# Patient Record
Sex: FEMALE | Race: OTHER | HISPANIC OR LATINO | Employment: FULL TIME | URBAN - METROPOLITAN AREA
[De-identification: names, ages, dates, MRNs, and addresses within clinical notes are randomized per-mention and may not be internally consistent; named-entity substitution may affect disease eponyms.]

---

## 2018-01-25 ENCOUNTER — HOSPITAL ENCOUNTER (INPATIENT)
Facility: HOSPITAL | Age: 43
LOS: 2 days | Discharge: HOME | DRG: 758 | End: 2018-01-27
Attending: EMERGENCY MEDICINE | Admitting: OBSTETRICS & GYNECOLOGY

## 2018-01-25 ENCOUNTER — APPOINTMENT (EMERGENCY)
Dept: RADIOLOGY | Facility: HOSPITAL | Age: 43
DRG: 758 | End: 2018-01-25

## 2018-01-25 ENCOUNTER — APPOINTMENT (EMERGENCY)
Dept: CT IMAGING | Facility: HOSPITAL | Age: 43
DRG: 758 | End: 2018-01-25

## 2018-01-25 DIAGNOSIS — M25.571 RIGHT ANKLE PAIN: ICD-10-CM

## 2018-01-25 DIAGNOSIS — N70.93 RIGHT PYOSALPINX: Primary | ICD-10-CM

## 2018-01-25 LAB
ALBUMIN SERPL BCP-MCNC: 3 G/DL (ref 3.5–5)
ALP SERPL-CCNC: 85 U/L (ref 46–116)
ALT SERPL W P-5'-P-CCNC: 18 U/L (ref 12–78)
ANION GAP SERPL CALCULATED.3IONS-SCNC: 8 MMOL/L (ref 4–13)
AST SERPL W P-5'-P-CCNC: 12 U/L (ref 5–45)
BACTERIA UR QL AUTO: ABNORMAL /HPF
BASOPHILS # BLD AUTO: 0.02 THOUSANDS/ΜL (ref 0–0.1)
BASOPHILS NFR BLD AUTO: 0 % (ref 0–1)
BILIRUB SERPL-MCNC: 0.29 MG/DL (ref 0.2–1)
BILIRUB UR QL STRIP: NEGATIVE
BUN SERPL-MCNC: 9 MG/DL (ref 5–25)
CALCIUM SERPL-MCNC: 8.7 MG/DL (ref 8.3–10.1)
CHLORIDE SERPL-SCNC: 98 MMOL/L (ref 100–108)
CLARITY UR: ABNORMAL
CO2 SERPL-SCNC: 29 MMOL/L (ref 21–32)
COLOR UR: YELLOW
CREAT SERPL-MCNC: 0.93 MG/DL (ref 0.6–1.3)
EOSINOPHIL # BLD AUTO: 0.1 THOUSAND/ΜL (ref 0–0.61)
EOSINOPHIL NFR BLD AUTO: 1 % (ref 0–6)
ERYTHROCYTE [DISTWIDTH] IN BLOOD BY AUTOMATED COUNT: 14.3 % (ref 11.6–15.1)
EST. AVERAGE GLUCOSE BLD GHB EST-MCNC: 111 MG/DL
EXT PREG TEST URINE: NORMAL
GFR SERPL CREATININE-BSD FRML MDRD: 75 ML/MIN/1.73SQ M
GLUCOSE SERPL-MCNC: 101 MG/DL (ref 65–140)
GLUCOSE UR STRIP-MCNC: NEGATIVE MG/DL
HBA1C MFR BLD: 5.5 % (ref 4.2–6.3)
HCT VFR BLD AUTO: 38 % (ref 34.8–46.1)
HGB BLD-MCNC: 12.6 G/DL (ref 11.5–15.4)
HGB UR QL STRIP.AUTO: ABNORMAL
HIV 1+2 AB+HIV1 P24 AG SERPL QL IA: NORMAL
HIV1 P24 AG SER QL: NORMAL
KETONES UR STRIP-MCNC: NEGATIVE MG/DL
LEUKOCYTE ESTERASE UR QL STRIP: ABNORMAL
LIPASE SERPL-CCNC: 90 U/L (ref 73–393)
LYMPHOCYTES # BLD AUTO: 2.15 THOUSANDS/ΜL (ref 0.6–4.47)
LYMPHOCYTES NFR BLD AUTO: 13 % (ref 14–44)
MCH RBC QN AUTO: 27.5 PG (ref 26.8–34.3)
MCHC RBC AUTO-ENTMCNC: 33.2 G/DL (ref 31.4–37.4)
MCV RBC AUTO: 83 FL (ref 82–98)
MONOCYTES # BLD AUTO: 1.62 THOUSAND/ΜL (ref 0.17–1.22)
MONOCYTES NFR BLD AUTO: 10 % (ref 4–12)
NEUTROPHILS # BLD AUTO: 12.32 THOUSANDS/ΜL (ref 1.85–7.62)
NEUTS SEG NFR BLD AUTO: 76 % (ref 43–75)
NITRITE UR QL STRIP: POSITIVE
NON-SQ EPI CELLS URNS QL MICRO: ABNORMAL /HPF
NRBC BLD AUTO-RTO: 0 /100 WBCS
PH UR STRIP.AUTO: 6 [PH] (ref 4.5–8)
PLATELET # BLD AUTO: 369 THOUSANDS/UL (ref 149–390)
PMV BLD AUTO: 9.6 FL (ref 8.9–12.7)
POTASSIUM SERPL-SCNC: 3.7 MMOL/L (ref 3.5–5.3)
PROT SERPL-MCNC: 7.8 G/DL (ref 6.4–8.2)
PROT UR STRIP-MCNC: ABNORMAL MG/DL
RBC # BLD AUTO: 4.59 MILLION/UL (ref 3.81–5.12)
RBC #/AREA URNS AUTO: ABNORMAL /HPF
SODIUM SERPL-SCNC: 135 MMOL/L (ref 136–145)
SP GR UR STRIP.AUTO: 1.01 (ref 1–1.03)
UROBILINOGEN UR QL STRIP.AUTO: 0.2 E.U./DL
WBC # BLD AUTO: 16.21 THOUSAND/UL (ref 4.31–10.16)
WBC #/AREA URNS AUTO: ABNORMAL /HPF

## 2018-01-25 PROCEDURE — 87086 URINE CULTURE/COLONY COUNT: CPT | Performed by: OBSTETRICS & GYNECOLOGY

## 2018-01-25 PROCEDURE — 74177 CT ABD & PELVIS W/CONTRAST: CPT

## 2018-01-25 PROCEDURE — 87806 HIV AG W/HIV1&2 ANTB W/OPTIC: CPT | Performed by: OBSTETRICS & GYNECOLOGY

## 2018-01-25 PROCEDURE — 83036 HEMOGLOBIN GLYCOSYLATED A1C: CPT | Performed by: OBSTETRICS & GYNECOLOGY

## 2018-01-25 PROCEDURE — 81001 URINALYSIS AUTO W/SCOPE: CPT

## 2018-01-25 PROCEDURE — 99221 1ST HOSP IP/OBS SF/LOW 40: CPT | Performed by: OBSTETRICS & GYNECOLOGY

## 2018-01-25 PROCEDURE — 81025 URINE PREGNANCY TEST: CPT | Performed by: EMERGENCY MEDICINE

## 2018-01-25 PROCEDURE — 73610 X-RAY EXAM OF ANKLE: CPT

## 2018-01-25 PROCEDURE — 80053 COMPREHEN METABOLIC PANEL: CPT | Performed by: EMERGENCY MEDICINE

## 2018-01-25 PROCEDURE — 36415 COLL VENOUS BLD VENIPUNCTURE: CPT | Performed by: EMERGENCY MEDICINE

## 2018-01-25 PROCEDURE — 99285 EMERGENCY DEPT VISIT HI MDM: CPT

## 2018-01-25 PROCEDURE — 96361 HYDRATE IV INFUSION ADD-ON: CPT

## 2018-01-25 PROCEDURE — 96374 THER/PROPH/DIAG INJ IV PUSH: CPT

## 2018-01-25 PROCEDURE — 87491 CHLMYD TRACH DNA AMP PROBE: CPT | Performed by: OBSTETRICS & GYNECOLOGY

## 2018-01-25 PROCEDURE — 81002 URINALYSIS NONAUTO W/O SCOPE: CPT | Performed by: EMERGENCY MEDICINE

## 2018-01-25 PROCEDURE — 87591 N.GONORRHOEAE DNA AMP PROB: CPT | Performed by: OBSTETRICS & GYNECOLOGY

## 2018-01-25 PROCEDURE — 83690 ASSAY OF LIPASE: CPT | Performed by: EMERGENCY MEDICINE

## 2018-01-25 PROCEDURE — 85025 COMPLETE CBC W/AUTO DIFF WBC: CPT | Performed by: EMERGENCY MEDICINE

## 2018-01-25 RX ORDER — KETOROLAC TROMETHAMINE 30 MG/ML
15 INJECTION, SOLUTION INTRAMUSCULAR; INTRAVENOUS ONCE
Status: COMPLETED | OUTPATIENT
Start: 2018-01-25 | End: 2018-01-25

## 2018-01-25 RX ORDER — SODIUM CHLORIDE, SODIUM LACTATE, POTASSIUM CHLORIDE, CALCIUM CHLORIDE 600; 310; 30; 20 MG/100ML; MG/100ML; MG/100ML; MG/100ML
125 INJECTION, SOLUTION INTRAVENOUS CONTINUOUS
Status: DISCONTINUED | OUTPATIENT
Start: 2018-01-25 | End: 2018-01-27 | Stop reason: HOSPADM

## 2018-01-25 RX ORDER — NICOTINE 21 MG/24HR
1 PATCH, TRANSDERMAL 24 HOURS TRANSDERMAL DAILY
Status: DISCONTINUED | OUTPATIENT
Start: 2018-01-26 | End: 2018-01-27 | Stop reason: HOSPADM

## 2018-01-25 RX ORDER — OXYCODONE HYDROCHLORIDE AND ACETAMINOPHEN 5; 325 MG/1; MG/1
1 TABLET ORAL EVERY 4 HOURS PRN
Status: DISCONTINUED | OUTPATIENT
Start: 2018-01-25 | End: 2018-01-27 | Stop reason: HOSPADM

## 2018-01-25 RX ORDER — DOCUSATE SODIUM 100 MG/1
100 CAPSULE, LIQUID FILLED ORAL 2 TIMES DAILY
Status: DISCONTINUED | OUTPATIENT
Start: 2018-01-25 | End: 2018-01-27 | Stop reason: HOSPADM

## 2018-01-25 RX ORDER — IBUPROFEN 600 MG/1
600 TABLET ORAL EVERY 6 HOURS PRN
Status: DISCONTINUED | OUTPATIENT
Start: 2018-01-25 | End: 2018-01-27 | Stop reason: HOSPADM

## 2018-01-25 RX ADMIN — SODIUM CHLORIDE, SODIUM LACTATE, POTASSIUM CHLORIDE, AND CALCIUM CHLORIDE 125 ML/HR: .6; .31; .03; .02 INJECTION, SOLUTION INTRAVENOUS at 18:51

## 2018-01-25 RX ADMIN — DOXYCYCLINE 100 MG: 100 INJECTION, POWDER, LYOPHILIZED, FOR SOLUTION INTRAVENOUS at 21:17

## 2018-01-25 RX ADMIN — KETOROLAC TROMETHAMINE 15 MG: 30 INJECTION, SOLUTION INTRAMUSCULAR at 10:46

## 2018-01-25 RX ADMIN — CEFOXITIN 2000 MG: 1 INJECTION, POWDER, FOR SOLUTION INTRAVENOUS at 20:20

## 2018-01-25 RX ADMIN — DOCUSATE SODIUM 100 MG: 100 CAPSULE, LIQUID FILLED ORAL at 21:16

## 2018-01-25 RX ADMIN — IOHEXOL 100 ML: 350 INJECTION, SOLUTION INTRAVENOUS at 13:46

## 2018-01-25 RX ADMIN — SODIUM CHLORIDE 1000 ML: 0.9 INJECTION, SOLUTION INTRAVENOUS at 10:45

## 2018-01-25 RX ADMIN — OXYCODONE HYDROCHLORIDE AND ACETAMINOPHEN 1 TABLET: 5; 325 TABLET ORAL at 21:16

## 2018-01-25 RX ADMIN — SODIUM CHLORIDE 1000 ML: 0.9 INJECTION, SOLUTION INTRAVENOUS at 12:18

## 2018-01-25 NOTE — ED NOTES
Pt sleeping soundly, awakens easily and states pain is "3" currently        Alvaro Clayton, TANMAY  01/25/18 7848

## 2018-01-25 NOTE — ED PROVIDER NOTES
History  Chief Complaint   Patient presents with    Pelvic Pain     Pt reports lower pelvic pain and bilateral leg soreness that started on monday  Pt denies vaginal bleeding, discharge, nausea, vomiting or urinary s/s  Pt reports legs feel sore, denies injury but states stands on her feet all day    Leg Pain     Having RLQ pain x 3 days and right ankle pain/swelling  Denies injury  Denies redness to ankle  History provided by:  Patient   used: No    Abdominal Pain   Pain location:  RLQ  Pain quality: cramping    Pain radiates to:  Does not radiate  Duration:  3 days  Timing:  Constant  Progression:  Unchanged  Chronicity:  New  Context: previous surgery ()    Relieved by:  Nothing  Worsened by:  Nothing  Ineffective treatments:  None tried  Associated symptoms: no chills, no constipation, no cough, no diarrhea, no dysuria, no fever, no hematuria, no nausea, no vaginal bleeding, no vaginal discharge and no vomiting    Ankle Pain   Location:  Ankle  Injury: no    Ankle location:  R ankle  Pain details:     Quality:  Aching    Radiates to:  Does not radiate    Timing:  Constant    Progression:  Unchanged  Chronicity:  New  Prior injury to area:  No  Relieved by:  Nothing  Worsened by:  Bearing weight  Ineffective treatments:  None tried  Associated symptoms: swelling    Associated symptoms: no back pain, no decreased ROM, no fever, no muscle weakness, no numbness, no stiffness and no tingling        None       History reviewed  No pertinent past medical history  Past Surgical History:   Procedure Laterality Date     SECTION         History reviewed  No pertinent family history  I have reviewed and agree with the history as documented      Social History   Substance Use Topics    Smoking status: Current Every Day Smoker    Smokeless tobacco: Never Used    Alcohol use Yes      Comment: social         Review of Systems   Constitutional: Negative for appetite change, chills and fever  HENT: Negative for congestion and rhinorrhea  Respiratory: Negative for cough  Gastrointestinal: Positive for abdominal pain  Negative for abdominal distention, blood in stool, constipation, diarrhea, nausea and vomiting  Genitourinary: Negative for dysuria, flank pain, frequency, hematuria, urgency, vaginal bleeding and vaginal discharge  Musculoskeletal: Negative for back pain and stiffness  Right ankle pain   All other systems reviewed and are negative  Physical Exam  ED Triage Vitals [01/25/18 1005]   Temperature Pulse Respirations Blood Pressure SpO2   97 9 °F (36 6 °C) (!) 110 16 135/96 100 %      Temp Source Heart Rate Source Patient Position - Orthostatic VS BP Location FiO2 (%)   Oral Monitor Sitting Right arm --      Pain Score       4           Orthostatic Vital Signs  Vitals:    01/25/18 1005 01/25/18 1114 01/25/18 1301 01/25/18 1514   BP: 135/96 110/89 111/56 113/58   Pulse: (!) 110 90 82 86   Patient Position - Orthostatic VS: Sitting Sitting  Lying       Physical Exam   Constitutional: She is oriented to person, place, and time  She appears well-developed and well-nourished  No distress  HENT:   Head: Normocephalic  Mouth/Throat: Oropharynx is clear and moist and mucous membranes are normal    Neck: Normal range of motion  Neck supple  Cardiovascular: Normal rate, regular rhythm, normal heart sounds and intact distal pulses  Exam reveals no gallop and no friction rub  No murmur heard  Pulmonary/Chest: Effort normal and breath sounds normal  No respiratory distress  She has no wheezes  She has no rales  Abdominal: Soft  Normal appearance and bowel sounds are normal  She exhibits no distension and no mass  There is no hepatosplenomegaly  There is tenderness in the right lower quadrant  There is no rigidity, no rebound, no guarding and no CVA tenderness  Musculoskeletal:        Right ankle: She exhibits swelling   She exhibits normal range of motion, no ecchymosis, no deformity, no laceration and normal pulse  Tenderness  Lateral malleolus and medial malleolus tenderness found  Right lower leg: Normal    No calf tenderness   Lymphadenopathy:     She has no cervical adenopathy  Neurological: She is alert and oriented to person, place, and time  Skin: Skin is warm, dry and intact  No rash noted  No pallor  Nursing note and vitals reviewed  ED Medications  Medications   sodium chloride 0 9 % bolus 1,000 mL (0 mL Intravenous Stopped 1/25/18 1217)   ketorolac (TORADOL) injection 15 mg (15 mg Intravenous Given 1/25/18 1046)   sodium chloride 0 9 % bolus 1,000 mL (0 mL Intravenous Stopped 1/25/18 1514)   iohexol (OMNIPAQUE) 350 MG/ML injection (MULTI-DOSE) 100 mL (100 mL Intravenous Given 1/25/18 1346)       Diagnostic Studies  Results Reviewed     Procedure Component Value Units Date/Time    Chlamydia/GC amplified DNA by PCR [[id number]] Collected:  01/25/18 1601    Lab Status:   In process Specimen:  Genital from Cervix Updated:  01/25/18 1603    Urine Microscopic [[id number]]  (Abnormal) Collected:  01/25/18 1334    Lab Status:  Final result Specimen:  Urine from Urine, Clean Catch Updated:  01/25/18 1414     RBC, UA 4-10 (A) /hpf      WBC, UA 4-10 (A) /hpf      Epithelial Cells Moderate (A) /hpf      Bacteria, UA Moderate (A) /hpf     POCT urinalysis dipstick [[id number]]  (Abnormal) Resulted:  01/25/18 1336    Lab Status:  Final result Specimen:  Urine Updated:  01/25/18 1336    POCT pregnancy, urine [[id number]]  (Normal) Resulted:  01/25/18 1336    Lab Status:  Final result Updated:  01/25/18 1336     EXT PREG TEST UR (Ref: Negative) HCG = neg (-)    ED Urine Macroscopic [[id number]]  (Abnormal) Collected:  01/25/18 1334    Lab Status:  Final result Specimen:  Urine Updated:  01/25/18 1335     Color, UA Yellow     Clarity, UA Slightly Cloudy     pH, UA 6 0     Leukocytes, UA Trace (A)     Nitrite, UA Positive (A)     Protein, UA 30 (1+) (A) mg/dl      Glucose, UA Negative mg/dl      Ketones, UA Negative mg/dl      Urobilinogen, UA 0 2 E U /dl      Bilirubin, UA Negative     Blood, UA Small (A)     Specific Delta, UA 1 015    Narrative:       CLINITEK RESULT    Comprehensive metabolic panel [[id number]]  (Abnormal) Collected:  01/25/18 1044    Lab Status:  Final result Specimen:  Blood from Arm, Left Updated:  01/25/18 1146     Sodium 135 (L) mmol/L      Potassium 3 7 mmol/L      Chloride 98 (L) mmol/L      CO2 29 mmol/L      Anion Gap 8 mmol/L      BUN 9 mg/dL      Creatinine 0 93 mg/dL      Glucose 101 mg/dL      Calcium 8 7 mg/dL      AST 12 U/L      ALT 18 U/L      Alkaline Phosphatase 85 U/L      Total Protein 7 8 g/dL      Albumin 3 0 (L) g/dL      Total Bilirubin 0 29 mg/dL      eGFR 75 ml/min/1 73sq m     Narrative:         National Kidney Disease Education Program recommendations are as follows:  GFR calculation is accurate only with a steady state creatinine  Chronic Kidney disease less than 60 ml/min/1 73 sq  meters  Kidney failure less than 15 ml/min/1 73 sq  meters      Lipase [[id number]]  (Normal) Collected:  01/25/18 1044    Lab Status:  Final result Specimen:  Blood from Arm, Left Updated:  01/25/18 1126     Lipase 90 u/L     CBC and differential [[id number]]  (Abnormal) Collected:  01/25/18 1044    Lab Status:  Final result Specimen:  Blood from Arm, Left Updated:  01/25/18 1053     WBC 16 21 (H) Thousand/uL      RBC 4 59 Million/uL      Hemoglobin 12 6 g/dL      Hematocrit 38 0 %      MCV 83 fL      MCH 27 5 pg      MCHC 33 2 g/dL      RDW 14 3 %      MPV 9 6 fL      Platelets 274 Thousands/uL      nRBC 0 /100 WBCs      Neutrophils Relative 76 (H) %      Lymphocytes Relative 13 (L) %      Monocytes Relative 10 %      Eosinophils Relative 1 %      Basophils Relative 0 %      Neutrophils Absolute 12 32 (H) Thousands/µL      Lymphocytes Absolute 2 15 Thousands/µL      Monocytes Absolute 1 62 (H) Thousand/µL      Eosinophils Absolute 0 10 Thousand/µL      Basophils Absolute 0 02 Thousands/µL                  CT abdomen pelvis with contrast   Final Result by Maria C Bermudez DO (01/25 1421)   1  No CT evidence of acute appendicitis  2   Abnormal appearance of the right adnexa and right fallopian tube, suspicious for pyosalpinx  No evidence of tubo-ovarian abscess at this time  OB/GYN consultation recommended for further evaluation  I personally discussed this study with ZITA CASAS MERI on 1/25/2018 2:21 PM          Workstation performed: [id number]         XR ankle 3+ views RIGHT   ED Interpretation by Rip Song DO (01/25 1111)   No fracture      Final Result by Philomena Cortes MD (01/25 1150)   No acute fracture  Lateral talar dome 8mm lucency consistent with osteochondral lesion would be better evaluated by MRI  The study was marked in EPIC for significant notification  Workstation performed: [id number]                    Procedures  Procedures       Phone Contacts  ED Phone Contact    ED Course  ED Course as of Jan 25 1634   Thu Jan 25, 2018   1424 Received call Dr Corin Chamorro from radiology about pyosalpinx  He recommends OBGYN consult  He states pelvic US won't add anything  OBGYN paged  46 Discussed case with OBGYN resident who will come see pt  Pt updated on plan  Pt also aware of ankle lesion requiring outpt MRI  Will give pt PCP information to obtain MRI  MDM  Number of Diagnoses or Management Options  Diagnosis management comments: 1  RLQ pain - Will check CBC as marker of infection, CMP to r/o hepatitis/biliary disease, lipase to r/o pancreatitis, urine to r/o UTI, urine preg to r/o ectopic pregnancy, CT A/P to r/o appendicitis  2   Right ankle pain - Will xray to r/o occult fx         Amount and/or Complexity of Data Reviewed  Clinical lab tests: reviewed and ordered  Tests in the radiology section of CPT®: ordered and reviewed  Tests in the medicine section of CPT®: ordered and reviewed      CritCare Time    Disposition  Final diagnoses:   Right pyosalpinx   Right ankle pain - Lesion to right ankle     Time reflects when diagnosis was documented in both MDM as applicable and the Disposition within this note     Time User Action Codes Description Comment    1/25/2018  2:49 PM Samanta Randall [N70 93] Right pyosalpinx     1/25/2018  2:49 PM Randi Iraheta [[id number]] Lesion of soft tissue of lower leg and ankle     1/25/2018  2:49 PM Wanda Montesinos [[id number]] Lesion of soft tissue of lower leg and ankle     1/25/2018  2:49 PM Ruth Hawkins Right ankle pain     1/25/2018  2:50 PM Kelley Jeffery Right ankle pain Lesion to right ankle      ED Disposition     ED Disposition Condition Comment    Admit  Case was discussed with OBGYN resident and the patient's admission status was agreed to be Admission Status: inpatient status to the service of Dr Yolande Bailey  Follow-up Information     Follow up With Specialties Details Why [address]  Schedule an appointment as soon as possible for a visit To obtain MRI for the right ankle lesion [address]  [phone]          Patient's Medications    No medications on file     No discharge procedures on file      ED Provider  Electronically Signed by           Sandra Abarca, DO  01/25/18 2193

## 2018-01-25 NOTE — H&P
H&P Exam - Nelly Vergara 38 y o  female MRN: [id number]    Unit/Bed#: ED 27 Encounter: [id number]    Assessment:  37 yo , present with  4 days Hx of worsening RLQ pain, with clinical and imaging findings suspicious for PID/Pyosalpinx    Plan:  1  PID/Pyosalpinx: Non surgical abdomen  UPT negative  Currently  afebrile   Leukocytosis at 16 with no bands  + CMT and uterine tenderness on exam  Copious amount of thick white vaginal discharge, with no pus noted from the cervical os  CT findings suspicious for pyosalpinx, with no tubo-ovarian abscess identified  Patient will be admitted for 48 -72  hours of IV abx ( Cefoxitin 2 grams Q6 hrs and doxycycline 100 mg Q12 hrs) and close monitoring of her symptoms  Plans for IR drainage vs  Surgical interventions will be based non response/worsening of her complaints despite adequate abx treament for 48-72 hours  We also plan to do a pelvic US for better characterization of the pelvis /adnexal  F/U GC/CT, Hemoglobin A1C and HIV testing  Serial abdominal exams  2  UTI: + nitrite , Trace leuk, and + blood on UA  denies dysuria or frequency  Cefoxitin 2 Grams Q 6 hours   F/U Ucx    3  Right ankle pain: No acute fracture  Lateral talar dome 8mm lucency consistent with osteochondral lesion would be better evaluated by MRI  Patient aware that she will follow up outpatient w/ her PCP for MRI and additional evalaution    4  Pain: Motrin prn, percocet PRN    5  FEN: regular diet , IVF    6  Current smoker: nicotine patch    7  PPx: BL SCDs    History of Present Illness      37 yo , LMP 3 weeks ago presenting with  4 days history of right lower quadrant pain  Initially the patient thought that  her pain was secondary to constipated, and so she took some laxative, and was able to have a bowel movements  Nevertheless the pain persisted, and continue to worsens, prompting her to seek evaluation in the ER    Patient says that the pain is constant, but is particularly worst with movements  Of note, patient reports subjective fever and chills two days ago that has since resolved  Patient denies any associated nausea, vomiting, dysuria, abnormal vaginal bleeding, or discharge  Patient also denies chest pain or chest discomfort  Patient says she is sexually active with one partner and denies any hx STDs in the past     Review of Systems   Constitutional: Negative for chills and fever  Respiratory: Negative for cough, shortness of breath and wheezing  Cardiovascular: Negative for chest pain, palpitations and leg swelling  Gastrointestinal: Positive for abdominal pain and constipation  Negative for abdominal distention, diarrhea, nausea and vomiting  Genitourinary: Positive for pelvic pain and vaginal discharge  Negative for difficulty urinating, dyspareunia, dysuria, flank pain, frequency and vaginal bleeding  Musculoskeletal: Positive for joint swelling  Neurological: Negative for dizziness, syncope, weakness, light-headedness and headaches  Psychiatric/Behavioral: Negative for agitation and confusion  Historical Information   History reviewed  No pertinent past medical history    Past Surgical History:   Procedure Laterality Date     SECTION       Social History   History   Alcohol Use    Yes     Comment: social      History   Drug Use No     History   Smoking Status    Current Every Day Smoker   Smokeless Tobacco    Never Used     Family History: non-contributory    Meds/Allergies   all medications and allergies reviewed  Allergies   Allergen Reactions    Aspirin Facial Swelling       Objective   First Vitals:   Blood Pressure: 135/96 (18 1005)  Pulse: (!) 110 (18 1005)  Temperature: 97 9 °F (36 6 °C) (18 1005)  Temp Source: Oral (18 1005)  Respirations: 16 (18 1005)  Weight - Scale: 86 2 kg (190 lb) (18 1005)  SpO2: 100 % (18 1005)    Current Vitals:   Blood Pressure: 113/58 (18 1514)  Pulse: 86 (18 1514)  Temperature: 98 1 °F (36 7 °C) (01/25/18 1114)  Temp Source: Oral (01/25/18 1114)  Respirations: 16 (01/25/18 1514)  Weight - Scale: 86 2 kg (190 lb) (01/25/18 1005)  SpO2: 100 % (01/25/18 1514)      Intake/Output Summary (Last 24 hours) at 01/25/18 1627  Last data filed at 01/25/18 1514   Gross per 24 hour   Intake             2000 ml   Output                0 ml   Net             2000 ml       Invasive Devices     Peripheral Intravenous Line            Peripheral IV 01/25/18 Left Antecubital less than 1 day                Physical Exam   Constitutional: She is oriented to person, place, and time  She appears well-developed and well-nourished  HENT:   Head: Normocephalic and atraumatic  Cardiovascular: Normal rate, regular rhythm and normal heart sounds  Pulmonary/Chest: Effort normal and breath sounds normal  No respiratory distress  She has no wheezes  Abdominal: Soft  She exhibits no distension and no mass  There is no tenderness  There is no rebound and no guarding  Genitourinary:   Genitourinary Comments: SSE: copious amount of thick white vaginal discharge, + CMT, no abnormal vaginal bleeding or pus noted from cervical os    Bimanual exam: + CMT and uterine tenderness  No adnexal fullness noted   Neurological: She is alert and oriented to person, place, and time  Psychiatric: She has a normal mood and affect  Her behavior is normal    Nursing note and vitals reviewed  Pelvic US 1/25/2018:  REPRODUCTIVE ORGANS:    Evaluation of the female pelvic organs demonstrates the presence of air in the vagina, likely due to recent instrumentation  Heterogeneous appearance of the uterus, most compatible with myomatous changes      There is a dilated, serpiginous, tubular fluid-filled structure in the right adnexa with Hounsfield units measuring maximum of 30 (series 2, image 64)  Mild enhancement of the walls  Inflammatory changes in the right adnexa    Findings are suspicious   for a dilated fallopian tube with associated inflammation/infection  Right ovarian cyst, measuring 3 6 cm (series 2, image 69)     Left ovarian follicles      URINARY BLADDER:  Decompressed and not visualized      ABDOMINAL WALL/INGUINAL REGIONS:  Unremarkable      OSSEOUS STRUCTURES:  No acute fracture or destructive osseous lesion      IMPRESSION:  1  No CT evidence of acute appendicitis  2   Abnormal appearance of the right adnexa and right fallopian tube, suspicious for pyosalpinx  No evidence of tubo-ovarian abscess at this time  OB/GYN consultation recommended for further evaluation        Lab Results: Reviewed  Imaging: Reviewed      Code Status: Full code    Counseling / Coordination of Care: Total floor / unit time spent today 30 minutes  and Greater than 50% of total time was spent with the patient and / or family counseling and / or coordination of care  A description of the counseling / coordination of care: Patient care and counseling           Germaine Ya   1/25/2018

## 2018-01-25 NOTE — ED NOTES
Called and gave 10-minute notice for admission to [address] [place] [address]        Arabella Werner RN  01/25/18 [id number]

## 2018-01-26 ENCOUNTER — APPOINTMENT (INPATIENT)
Dept: ULTRASOUND IMAGING | Facility: HOSPITAL | Age: 43
DRG: 758 | End: 2018-01-26

## 2018-01-26 LAB
ANION GAP SERPL CALCULATED.3IONS-SCNC: 6 MMOL/L (ref 4–13)
BASOPHILS # BLD AUTO: 0.02 THOUSANDS/ΜL (ref 0–0.1)
BASOPHILS NFR BLD AUTO: 0 % (ref 0–1)
BUN SERPL-MCNC: 9 MG/DL (ref 5–25)
CALCIUM SERPL-MCNC: 9.2 MG/DL (ref 8.3–10.1)
CHLAMYDIA DNA CVX QL NAA+PROBE: NORMAL
CHLORIDE SERPL-SCNC: 103 MMOL/L (ref 100–108)
CO2 SERPL-SCNC: 26 MMOL/L (ref 21–32)
CREAT SERPL-MCNC: 0.71 MG/DL (ref 0.6–1.3)
EOSINOPHIL # BLD AUTO: 0.31 THOUSAND/ΜL (ref 0–0.61)
EOSINOPHIL NFR BLD AUTO: 3 % (ref 0–6)
ERYTHROCYTE [DISTWIDTH] IN BLOOD BY AUTOMATED COUNT: 14.4 % (ref 11.6–15.1)
GFR SERPL CREATININE-BSD FRML MDRD: 104 ML/MIN/1.73SQ M
GLUCOSE SERPL-MCNC: 102 MG/DL (ref 65–140)
HCT VFR BLD AUTO: 33.3 % (ref 34.8–46.1)
HGB BLD-MCNC: 10.9 G/DL (ref 11.5–15.4)
LYMPHOCYTES # BLD AUTO: 1.72 THOUSANDS/ΜL (ref 0.6–4.47)
LYMPHOCYTES NFR BLD AUTO: 16 % (ref 14–44)
MCH RBC QN AUTO: 27.3 PG (ref 26.8–34.3)
MCHC RBC AUTO-ENTMCNC: 32.7 G/DL (ref 31.4–37.4)
MCV RBC AUTO: 84 FL (ref 82–98)
MONOCYTES # BLD AUTO: 1.13 THOUSAND/ΜL (ref 0.17–1.22)
MONOCYTES NFR BLD AUTO: 11 % (ref 4–12)
N GONORRHOEA DNA GENITAL QL NAA+PROBE: NORMAL
NEUTROPHILS # BLD AUTO: 7.32 THOUSANDS/ΜL (ref 1.85–7.62)
NEUTS SEG NFR BLD AUTO: 70 % (ref 43–75)
PLATELET # BLD AUTO: 410 THOUSANDS/UL (ref 149–390)
PMV BLD AUTO: 9.9 FL (ref 8.9–12.7)
POTASSIUM SERPL-SCNC: 3.9 MMOL/L (ref 3.5–5.3)
RBC # BLD AUTO: 3.99 MILLION/UL (ref 3.81–5.12)
SODIUM SERPL-SCNC: 135 MMOL/L (ref 136–145)
WBC # BLD AUTO: 10.5 THOUSAND/UL (ref 4.31–10.16)

## 2018-01-26 PROCEDURE — 85025 COMPLETE CBC W/AUTO DIFF WBC: CPT | Performed by: OBSTETRICS & GYNECOLOGY

## 2018-01-26 PROCEDURE — 76830 TRANSVAGINAL US NON-OB: CPT

## 2018-01-26 PROCEDURE — 99232 SBSQ HOSP IP/OBS MODERATE 35: CPT | Performed by: OBSTETRICS & GYNECOLOGY

## 2018-01-26 PROCEDURE — 76856 US EXAM PELVIC COMPLETE: CPT

## 2018-01-26 PROCEDURE — 80048 BASIC METABOLIC PNL TOTAL CA: CPT | Performed by: OBSTETRICS & GYNECOLOGY

## 2018-01-26 RX ADMIN — DOXYCYCLINE 100 MG: 100 INJECTION, POWDER, LYOPHILIZED, FOR SOLUTION INTRAVENOUS at 20:54

## 2018-01-26 RX ADMIN — CEFOXITIN 2000 MG: 1 INJECTION, POWDER, FOR SOLUTION INTRAVENOUS at 06:32

## 2018-01-26 RX ADMIN — DOXYCYCLINE 100 MG: 100 INJECTION, POWDER, LYOPHILIZED, FOR SOLUTION INTRAVENOUS at 09:09

## 2018-01-26 RX ADMIN — CEFOXITIN 2000 MG: 1 INJECTION, POWDER, FOR SOLUTION INTRAVENOUS at 22:25

## 2018-01-26 RX ADMIN — DOCUSATE SODIUM 100 MG: 100 CAPSULE, LIQUID FILLED ORAL at 18:16

## 2018-01-26 RX ADMIN — CEFOXITIN 2000 MG: 1 INJECTION, POWDER, FOR SOLUTION INTRAVENOUS at 00:28

## 2018-01-26 RX ADMIN — CEFOXITIN 2000 MG: 1 INJECTION, POWDER, FOR SOLUTION INTRAVENOUS at 14:13

## 2018-01-26 RX ADMIN — SODIUM CHLORIDE, SODIUM LACTATE, POTASSIUM CHLORIDE, AND CALCIUM CHLORIDE 125 ML/HR: .6; .31; .03; .02 INJECTION, SOLUTION INTRAVENOUS at 05:05

## 2018-01-26 RX ADMIN — DOCUSATE SODIUM 100 MG: 100 CAPSULE, LIQUID FILLED ORAL at 09:09

## 2018-01-26 RX ADMIN — OXYCODONE HYDROCHLORIDE AND ACETAMINOPHEN 1 TABLET: 5; 325 TABLET ORAL at 20:59

## 2018-01-26 NOTE — PROGRESS NOTES
Agree with previous nursing assessment  Patient is resting comfortably  No complaints at this time  Will continue to monitor

## 2018-01-26 NOTE — PROGRESS NOTES
OB Gyn Progress note   Analy Reyes 38 y o  female MRN: [id number]  Unit/Bed#: [place] [address] 216-01 Encounter: [id number]    Analy Reyes has no current complaints  RLQ pain is significantly improved this am    Pain is present - adequately treated  Patient is currently voiding  She is ambulating  Patient is currently passing flatus and has had no bowel movement  She is tolerating PO, and denies nausea or vomitting  Patient denies fever, chills, chest pain, shortness of breath, or calf tenderness  /58 (BP Location: Right arm)   Pulse 87   Temp 98 5 °F (36 9 °C) (Tympanic)   Resp 20   Wt 86 2 kg (190 lb)   LMP  (Within Weeks)   SpO2 98%     I/O last 3 completed shifts: In:  [IV Piggyback:]  Out: -   No intake/output data recorded  Lab Results   Component Value Date    WBC 10 50 (H) 2018    HGB 10 9 (L) 2018    HCT 33 3 (L) 2018    MCV 84 2018     (H) 2018       Lab Results   Component Value Date    GLUCOSE 101 2018    CALCIUM 8 7 2018     (L) 2018    K 3 7 2018    CO2 29 2018    CL 98 (L) 2018    BUN 9 2018    CREATININE 0 93 2018       No results found for: POCGLU    Physical Exam  Gen: AAOx3, NAD, comfortable in bed  CVS: S1S2+, RRR, no murmurs  Lungs: CTA b/l normal respiratory effort and rate  Abdomen: soft, non tender to palpation, no rebound or guarding  Extremities: SCDs on and on, non tender    A/P:  38years old  with PID/Pyosalpinx, HD # 2, stable    1)PID/Pyosalpinx:  Pain  is significantly improved this morning  She remains afebrile  Leukocytosis is trending down from 16 K to 10 K  this morning  Ultrasound pending  Continue Cefoxitin 2 g q 6 hours and doxycycline 100 mg q 12 hours  A1c and HIV were normal   GC CT still pending  Continues serial abdominal exams    recommendation will be for 48 hours of IV antibiotics and then transitioned to p  o  antibiotics with doxycycline plus or minus Flagyl  2) UTI: Cefoxitin  F/u Ucx  3) R ankle pain: No Fracture on Xray   Outpatient f/u  4) Pain: Motrin prn, percocet prn  5) FEN: reg diet, IVF  6) Current smoker: nicotine patch  7) DVT PPx: SCDs  8) Encouraged ambulation as tolerated    Lorie Modi   1/26/2018

## 2018-01-26 NOTE — CASE MANAGEMENT
Initial Clinical Review    Admission: Date/Time/Statement: 18 @ 1600     Orders Placed This Encounter   Procedures    Inpatient Admission     Standing Status:   Standing     Number of Occurrences:   1     Order Specific Question:   Admitting Physician     Answer:   Jeremías Villanueva [[id number]]     Order Specific Question:   Level of Care     Answer:   Med Surg [[id number]]     Order Specific Question:   Estimated length of stay     Answer:   More than 2 Midnights     Order Specific Question:   Certification     Answer:   I certify that inpatient services are medically necessary for this patient for a duration of greater than two midnights  See H&P and MD Progress Notes for additional information about the patient's course of treatment  ED: Date/Time/Mode of Arrival:   ED Arrival Information     Expected Arrival Acuity Means of Arrival Escorted By Service Admission Type    - 2018 09:46 Urgent Walk-In Family Member OB/GYN Urgent    Arrival Complaint    Pelvic pain,pain in legs          Chief Complaint:   Chief Complaint   Patient presents with    Pelvic Pain     Pt reports lower pelvic pain and bilateral leg soreness that started on monday  Pt denies vaginal bleeding, discharge, nausea, vomiting or urinary s/s  Pt reports legs feel sore, denies injury but states stands on her feet all day    Leg Pain       History of Illness:    39 yo , LMP 3 weeks ago presenting with  4 days history of right lower quadrant pain  Initially the patient thought that  her pain was secondary to constipated, and so she took some laxative, and was able to have a bowel movements  Nevertheless the pain persisted, and continue to worsens, prompting her to seek evaluation in the ER  Patient says that the pain is constant, but is particularly worst with movements  Of note, patient reports subjective fever and chills two days ago that has since resolved   Patient denies any associated nausea, vomiting, dysuria, abnormal vaginal bleeding, or discharge  Patient also denies chest pain or chest discomfort  Patient says she is sexually active with one partner and denies any hx STDs in the past     ED Vital Signs:   ED Triage Vitals [01/25/18 1005]   Temperature Pulse Respirations Blood Pressure SpO2   97 9 °F (36 6 °C) (!) 110 16 135/96 100 %      Temp Source Heart Rate Source Patient Position - Orthostatic VS BP Location FiO2 (%)   Oral Monitor Sitting Right arm --      Pain Score       4        Wt Readings from Last 1 Encounters:   01/25/18 86 2 kg (190 lb)       Vital Signs (abnormal):    above    Abnormal Labs/Diagnostic Test Results:   NA  135  Albumin  3 0  WBC   16 21  Abs neutro   12 32  Ct  Abd/pelvis:   No CT evidence of acute appendicitis  2   Abnormal appearance of the right adnexa and right fallopian tube, suspicious for pyosalpinx   No evidence of tubo-ovarian abscess at this time   OB/GYN consultation recommended for further evaluation  X ray  r  Ankle:  No acute fracutre    ED Treatment:   Medication Administration from 01/25/2018 0946 to 01/25/2018 1819       Date/Time Order Dose Route Action Action by Comments     01/25/2018 1217 sodium chloride 0 9 % bolus 1,000 mL 0 mL Intravenous Stopped Ruben Herman RN      01/25/2018 1045 sodium chloride 0 9 % bolus 1,000 mL 1,000 mL Intravenous New Bag Win Miranda RN      01/25/2018 1046 ketorolac (TORADOL) injection 15 mg 15 mg Intravenous Given Win Miranda RN      01/25/2018 1514 sodium chloride 0 9 % bolus 1,000 mL 0 mL Intravenous Stopped Nely Morgan RN      01/25/2018 1218 sodium chloride 0 9 % bolus 1,000 mL 1,000 mL Intravenous New Bag Ruben Herman RN      01/25/2018 1346 iohexol (OMNIPAQUE) 350 MG/ML injection (MULTI-DOSE) 100 mL 100 mL Intravenous Given Eligha Pain           Past Medical/Surgical History:    Active Ambulatory Problems     Diagnosis Date Noted    No Active Ambulatory Problems     Resolved Ambulatory Problems     Diagnosis Date Noted    No Resolved Ambulatory Problems     No Additional Past Medical History       Admitting Diagnosis: Right ankle pain [M25 571]  Pelvic pain [R10 2]  Right pyosalpinx [N70 93]    Age/Sex: 38 y o  female    Assessment/Plan:    PID/Pyosalpinx: Non surgical abdomen  UPT negative  Currently  afebrile   Leukocytosis at 16 with no bands  + CMT and uterine tenderness on exam  Copious amount of thick white vaginal discharge, with no pus noted from the cervical os  CT findings suspicious for pyosalpinx, with no tubo-ovarian abscess identified  Patient will be admitted for 48 -72  hours of IV abx ( Cefoxitin 2 grams Q6 hrs and doxycycline 100 mg Q12 hrs) and close monitoring of her symptoms  Plans for IR drainage vs  Surgical interventions will be based non response/worsening of her complaints despite adequate abx treament for 48-72 hours  We also plan to do a pelvic US for better characterization of the pelvis /adnexal  F/U GC/CT, Hemoglobin A1C and HIV testing  Serial abdominal exams      2  UTI: + nitrite , Trace leuk, and + blood on UA  denies dysuria or frequency  Cefoxitin 2 Grams Q 6 hours   F/U Ucx     3  Right ankle pain: No acute fracture   Lateral talar dome 8mm lucency consistent with osteochondral lesion would be better evaluated by MRI  Patient aware that she will follow up outpatient w/ her PCP for MRI and additional evalaution     4  Pain: Motrin prn, percocet PRN     5   FEN: regular diet , IVF     6  Current smoker: nicotine patch     7  PPx: BL SCDs       Admission Orders:   IP    1/25  @    1608  Scheduled Meds:   cefOXitin 2,000 mg Intravenous Q6H   docusate sodium 100 mg Oral BID   doxycycline 100 mg Intravenous Q12H   nicotine 1 patch Transdermal Daily     Continuous Infusions:   lactated ringers 125 mL/hr Last Rate: 125 mL/hr (01/26/18 9316)     PRN Meds: ibuprofen    oxyCODONE-acetaminophen     Reg diet  Urine  C/s  Cons  OB/GYN    Thank you,  [address] in the [facility] by Dao Lind for 2017  Network Utilization Review Department  Phone: [phone]; Fax [phone]  ATTENTION: The Network Utilization Review Department is now centralized for our 7 Facilities  Make a note that we have a new phone and fax numbers for our Department  Please call with any questions or concerns to [phone] and carefully follow the prompts so that you are directed to the right person  All voicemails are confidential  Fax any determinations, approvals, denials, and requests for initial or continue stay review clinical to [phone]  Due to HIGH CALL volume, it would be easier if you could please send faxed requests to expedite your requests and in part, help us provide discharge notifications faster

## 2018-01-27 VITALS
HEART RATE: 89 BPM | TEMPERATURE: 98 F | RESPIRATION RATE: 20 BRPM | DIASTOLIC BLOOD PRESSURE: 70 MMHG | OXYGEN SATURATION: 98 % | WEIGHT: 190 LBS | SYSTOLIC BLOOD PRESSURE: 111 MMHG

## 2018-01-27 PROBLEM — N70.93 PYOSALPINX: Status: ACTIVE | Noted: 2018-01-27

## 2018-01-27 LAB — BACTERIA UR CULT: NORMAL

## 2018-01-27 PROCEDURE — 99238 HOSP IP/OBS DSCHRG MGMT 30/<: CPT | Performed by: OBSTETRICS & GYNECOLOGY

## 2018-01-27 RX ORDER — METRONIDAZOLE 500 MG/1
500 TABLET ORAL EVERY 12 HOURS SCHEDULED
Qty: 14 TABLET | Refills: 0 | Status: SHIPPED | OUTPATIENT
Start: 2018-01-27 | End: 2018-02-03

## 2018-01-27 RX ORDER — DOXYCYCLINE 100 MG/1
100 TABLET ORAL 2 TIMES DAILY
Qty: 28 TABLET | Refills: 0 | Status: SHIPPED | OUTPATIENT
Start: 2018-01-27 | End: 2018-01-27

## 2018-01-27 RX ORDER — DOXYCYCLINE 100 MG/1
100 TABLET ORAL 2 TIMES DAILY
Qty: 28 TABLET | Refills: 0 | Status: SHIPPED | OUTPATIENT
Start: 2018-01-27 | End: 2018-02-10

## 2018-01-27 RX ORDER — METRONIDAZOLE 500 MG/1
500 TABLET ORAL EVERY 12 HOURS SCHEDULED
Qty: 14 TABLET | Refills: 0 | Status: SHIPPED | OUTPATIENT
Start: 2018-01-27 | End: 2018-01-27

## 2018-01-27 RX ADMIN — SODIUM CHLORIDE, SODIUM LACTATE, POTASSIUM CHLORIDE, AND CALCIUM CHLORIDE 125 ML/HR: .6; .31; .03; .02 INJECTION, SOLUTION INTRAVENOUS at 02:01

## 2018-01-27 RX ADMIN — CEFOXITIN 2000 MG: 1 INJECTION, POWDER, FOR SOLUTION INTRAVENOUS at 15:41

## 2018-01-27 RX ADMIN — CEFOXITIN 2000 MG: 1 INJECTION, POWDER, FOR SOLUTION INTRAVENOUS at 04:32

## 2018-01-27 RX ADMIN — DOXYCYCLINE 100 MG: 100 INJECTION, POWDER, LYOPHILIZED, FOR SOLUTION INTRAVENOUS at 06:29

## 2018-01-27 RX ADMIN — CEFOXITIN 2000 MG: 1 INJECTION, POWDER, FOR SOLUTION INTRAVENOUS at 10:05

## 2018-01-27 NOTE — PROGRESS NOTES
Progress Note - OB/GYN  Nadine Najera 38 y o  female MRN: [id number]  [facility] [address] 216-01 Encounter: [id number]      Nadine Najera has no current complaints  Pain is none  Patient is currently voiding  She is ambulating  Patient is currently passing flatus and has had no bowel movement  She is tolerating PO, and denies nausea or vomitting  Patient denies fever, chills, chest pain, shortness of breath, or calf tenderness  /55 (BP Location: Right arm)   Pulse 76   Temp 98 8 °F (37 1 °C) (Temporal)   Resp 20   Wt 86 2 kg (190 lb)   LMP  (Within Weeks)   SpO2 98%     I/O last 3 completed shifts: In: 1250 [I V :1000; IV Piggyback:250]  Out: -   No intake/output data recorded  Lab Results   Component Value Date    WBC 10 50 (H) 2018    HGB 10 9 (L) 2018    HCT 33 3 (L) 2018    MCV 84 2018     (H) 2018       Lab Results   Component Value Date    GLUCOSE 102 2018    CALCIUM 9 2 2018     (L) 2018    K 3 9 2018    CO2 26 2018     2018    BUN 9 2018    CREATININE 0 71 2018       No results found for: POCGLU    Physical Exam  Gen: AAOx3, NAD, comfortable in bed  CVS: S1S2+, RRR  Lungs: CTA b/l normal respiratory effort and rate  Abdomen: soft, non tender, No rebound or guarding  Extremities: SCDs on and on, non tender    A/P:38years old  with PID/Pyosalpinx, HD # 3, stable     1)PID/Pyosalpinx:  Pain is minimal   She remains afebrile  Ultrasound wnl  Continue Cefoxitin 2 g q 6 hours and doxycycline 100 mg q 12 hours  A1c and HIV were normal   GC CT neg  After 48 hours of IV antibiotics can transition to p  o  antibiotics with doxycycline plus or minus Flagyl for a total of 14 days  2) UTI: Cefoxitin  F/u Ucx  3) R ankle pain: No Fracture on Xray   Outpatient f/u  4) Pain: Motrin prn, percocet prn  5) FEN: reg diet, IVF  6) Current smoker: nicotine patch  7) DVT PPx: SCDs    Anticipate discharge home later today

## 2018-01-27 NOTE — DISCHARGE INSTRUCTIONS
Please complete your full antibiotic course of Doxycycline 100mg oral twice a day for 14days and Flagyl 500mg oral twice a day for 7 days  If any signs of fever, chills, nausea/emesis, severe abdominal pain, please call [facility] or go to ED  Upper genital infection   WHAT YOU NEED TO KNOW:   It is a condition that causes your reproductive organs to become inflamed  Your reproductive organs include your ovaries, fallopian tubes, uterus, cervix (lower area of your uterus), and vagina  DISCHARGE INSTRUCTIONS:   Seek care immediately if:   · You have chills or a high fever  · You have pain in your upper right abdomen  · You have pain in your lower abdomen that does not go away with rest or medicine  · Your symptoms get worse or do not improve after 3 days of treatment  Contact your healthcare provider if:   · You have nausea or are vomiting  · Your skin is red, itchy, or you have a new rash  · You think or know you are pregnant  · You have questions or concerns about your condition or care  Medicines:   · Medicines  may be given to prevent or fight a bacterial infection or to reduce pain  Ask your healthcare provider how to take pain medicine safely  · Take your medicine as directed  Contact your healthcare provider if you think your medicine is not helping or if you have side effects  Tell him or her if you are allergic to any medicine  Keep a list of the medicines, vitamins, and herbs you take  Include the amounts, and when and why you take them  Bring the list or the pill bottles to follow-up visits  Carry your medicine list with you in case of an emergency  Manage PID:   · Finish your treatment  If you do not finish your treatment for PID, your infection may not go away  You may also have an increased risk for another STI in the future  · Do not have sex until your healthcare provider says it is okay    You will need to finish treatment before it is safe to have sex      · Talk to your sex partners  If you have an STI, tell your recent partners  Tell them to see a healthcare provider for testing and treatment  This will help stop the spread of infection to others or back to you  Decrease your risk for PID:   · Do not have unprotected sex  Always use a latex condom  Do not have sex while you or your partners are being treated for an STI  · Get tested for STIs  before and after new sex partners  Talk to your partner and ask them to get tested  · Do not delay treatment for STIs or vaginal infections  Talk to your healthcare provider if you think you have an STI  Early treatment can prevent health problems that may lead to PID  Follow up with your healthcare provider as directed: You may need a follow up visit a few days after you start your treatment  Your healthcare provider may ask you if your recent sexual partners have also been treated for an STI  You may need more tests if your symptoms do not go away or worsen after treatment  Your treatment may need to be changed if your symptoms are not getting better  Write down your questions so you remember to ask them during your visits

## 2018-01-27 NOTE — DISCHARGE SUMMARY
Discharge Summary - Gynecology  Colette Watkins 38 y o  female MRN: [id number]  Unit/Bed#: [facility] [address] 216-01 Encounter: [id number]    Admission Date: 2018   Discharge Date: 2018    Attending Physician: Mary Albright    Admitting Diagnosis:   Right ankle pain [M25 571]  Pelvic pain [R10 2]  Right pyosalpinx [N70 93]      Discharge Diagnosis: same    Procedures Performed: none    Hospital Course:   38years old  with abdominal pain found to have a Pyosalpinx on imaging     1)Pyosalpinx:  Patient was treated for PID with Cefoxitin and doxycycline  She remained afebrile  After the initiation of treatment, her leukocytosis trended down from 16 K to 10 K  Ultrasound confirmed pyosalpinx  A1c and HIV were normal   GC CT was neg  After approximately 48 hours of IV antibiotics patient was then transitioned to p  o  antibiotics with doxycycline and flagyl outpatient x14 & 7 days respectively  2) UTI: Cefoxitin  F/u Ucx  3) R ankle pain: No Fracture on Xray  Outpatient f/u  4) Pain: Motrin prn, percocet prn  5) FEN: reg diet, IVF  6) Current smoker: nicotine patch  7) DVT PPx: SCDs    On day of discharge, she was ambulating and able to reasonably perform all ADLs  She was tolerating PO diet, voiding and had appropriate bowel function  Pain was well controlled with PO pain medications  Patient remained afebrile, abdominal pain significantly improved  She was discharged home on hospital day #3 without complications  Complications: none    Condition at Discharge: stable     Discharge Medications: See after visit summary for reconciled discharge medications provided to patient and family  Discharge instructions/Information to patient and family: See after visit summary for information provided to patient and family  Provisions for Follow-Up Care: See after visit summary for information related to follow-up care and any pertinent home health orders        Disposition: Home    Planned Readmission: No    Code Status: full code

## 2020-03-26 ENCOUNTER — HOSPITAL ENCOUNTER (EMERGENCY)
Facility: HOSPITAL | Age: 45
Discharge: HOME | End: 2020-03-26
Attending: EMERGENCY MEDICINE | Admitting: EMERGENCY MEDICINE

## 2020-03-26 VITALS
RESPIRATION RATE: 16 BRPM | WEIGHT: 225.75 LBS | SYSTOLIC BLOOD PRESSURE: 185 MMHG | HEART RATE: 97 BPM | TEMPERATURE: 97.6 F | DIASTOLIC BLOOD PRESSURE: 99 MMHG | OXYGEN SATURATION: 99 %

## 2020-03-26 DIAGNOSIS — Z02.89 ENCOUNTER TO OBTAIN EXCUSE FROM WORK: Primary | ICD-10-CM

## 2020-03-26 PROCEDURE — 99283 EMERGENCY DEPT VISIT LOW MDM: CPT

## 2020-03-26 PROCEDURE — 99281 EMR DPT VST MAYX REQ PHY/QHP: CPT | Performed by: PHYSICIAN ASSISTANT

## 2020-03-26 NOTE — DISCHARGE INSTRUCTIONS
Return to ED if you develop symptoms such as fever unable to be controlled with Tylenol/Motrin, chest pain or shortness of breath

## 2020-03-26 NOTE — ED PROVIDER NOTES
History  Chief Complaint   Patient presents with    Cough     Pt c/o cough since monday  Pt reports no fever  No sick contacts  I S  is a 50year old female presenting to the ED for a work note  Patient states she had a cough which began on Monday  She works for a [employer], so her employer sent to the ED to get tested for COVID-19  Patient has no known sick contacts and no COVID-19 exposures  She is not experiencing fevers, chills, headache, dizziness, sore throat, congestion, rhinorrhea, chest pain, SOB, abdominal pain, N/V/D or dysuria  Patient states she began with an occasionally productive cough on Monday which has since resolved  She states she coughed twice today and is not feeling sick  She presented to the ED because her job wanted her to get tested for COVID-19  Patient states she is constantly cleaning and uses a lot of cleaning products which she suspects made her cough  Her symptoms have since resolved  None       History reviewed  No pertinent past medical history  Past Surgical History:   Procedure Laterality Date     SECTION         History reviewed  No pertinent family history  I have reviewed and agree with the history as documented  E-Cigarette/Vaping    E-Cigarette Use Never User      E-Cigarette/Vaping Substances     Social History     Tobacco Use    Smoking status: Current Every Day Smoker     Packs/day: 0 50    Smokeless tobacco: Never Used   Substance Use Topics    Alcohol use: Yes     Comment: social     Drug use: No       Review of Systems   Constitutional: Negative for chills and fever  HENT: Negative for congestion, ear pain, rhinorrhea, sneezing and sore throat  Eyes: Negative for discharge and itching  Respiratory: Positive for cough  Negative for shortness of breath and wheezing  Cardiovascular: Negative for chest pain  Gastrointestinal: Negative for abdominal pain, diarrhea, nausea and vomiting     Genitourinary: Negative for difficulty urinating and dysuria  Musculoskeletal: Negative for myalgias  Skin: Negative for rash  Neurological: Negative for dizziness and headaches  Physical Exam  Physical Exam   Constitutional: She is oriented to person, place, and time  She appears well-developed and well-nourished  HENT:   Head: Normocephalic and atraumatic  Right Ear: External ear normal    Left Ear: External ear normal    Nose: Nose normal    Mouth/Throat: Oropharynx is clear and moist    Eyes: Pupils are equal, round, and reactive to light  Conjunctivae and EOM are normal    Neck: Normal range of motion  Neck supple  Cardiovascular: Normal rate, regular rhythm and normal heart sounds  Pulmonary/Chest: Effort normal and breath sounds normal    Abdominal: Soft  Bowel sounds are normal  There is no tenderness  Musculoskeletal: Normal range of motion  Lymphadenopathy:     She has no cervical adenopathy  Neurological: She is alert and oriented to person, place, and time  Skin: Skin is warm and dry  Psychiatric: She has a normal mood and affect  Her behavior is normal  Judgment and thought content normal    Nursing note and vitals reviewed        Vital Signs  ED Triage Vitals [03/26/20 1452]   Temperature Pulse Respirations Blood Pressure SpO2   97 6 °F (36 4 °C) 97 16 (!) 185/99 99 %      Temp Source Heart Rate Source Patient Position - Orthostatic VS BP Location FiO2 (%)   Oral Monitor Sitting Right arm --      Pain Score       --           Vitals:    03/26/20 1452   BP: (!) 185/99   Pulse: 97   Patient Position - Orthostatic VS: Sitting         Visual Acuity      ED Medications  Medications - No data to display    Diagnostic Studies  Results Reviewed     None                 No orders to display              Procedures  Procedures         ED Course                                 MDM  Number of Diagnoses or Management Options  Encounter to obtain excuse from work:   Diagnosis management comments: Patient states her symptoms resolved and she does not feel sick  She also reports that she presented to the ED because her job wanted her to get tested for COVID-19 since she presented with a cough early in the week  Patient with no known COVID+ contacts or recent travel  Patient does not meet requirements for COVID19 testing at this time  Discussed with patient that due to her recent cough, she should self quarantine 3 days and she should contact the hotline for further questions  A work note was provided stating she can return to work after she is asymptomatic for 72 hours  The patient verbalized understanding of our discussion and agrees to return to the Emergency Department for concerns and progression of illness  Disposition  Final diagnoses:   Encounter to obtain excuse from work     Time reflects when diagnosis was documented in both MDM as applicable and the Disposition within this note     Time User Action Codes Description Comment    3/26/2020  3:11 PM Oni Rene [Z02 [id number]] Encounter to obtain excuse from work       ED Disposition     ED Disposition Condition Date/Time Comment    Discharge Stable Thu Mar 26, 2020  3:10 PM Lisa Ibarra discharge to home/self care  Follow-up Information     Follow up With Specialties Details Why Contact Info Additional [address]  As needed, If symptoms worsen [address], [address] [phone]  30 [address], [address], [address] [place], [place], [address] [place] [address] Avenue          There are no discharge medications for this patient  No discharge procedures on file      PDMP Review     None          ED Provider  Electronically Signed by           Hortensia Perez PA-C  03/26/20 9657